# Patient Record
Sex: MALE | Employment: FULL TIME | URBAN - METROPOLITAN AREA
[De-identification: names, ages, dates, MRNs, and addresses within clinical notes are randomized per-mention and may not be internally consistent; named-entity substitution may affect disease eponyms.]

---

## 2017-07-19 ENCOUNTER — HOSPITAL ENCOUNTER (OUTPATIENT)
Dept: NON INVASIVE DIAGNOSTICS | Age: 62
Discharge: HOME | End: 2017-07-19
Attending: FAMILY MEDICINE
Payer: COMMERCIAL

## 2017-07-19 ENCOUNTER — HOSPITAL ENCOUNTER (OUTPATIENT)
Dept: ULTRASOUND IMAGING | Age: 62
Discharge: HOME | End: 2017-07-19
Attending: FAMILY MEDICINE
Payer: COMMERCIAL

## 2017-07-19 DIAGNOSIS — R94.31 ABNORMAL EKG: ICD-10-CM

## 2017-07-19 DIAGNOSIS — R74.02 NONSPECIFIC ELEVATION OF LEVELS OF TRANSAMINASE OR LACTIC ACID DEHYDROGENASE (LDH): ICD-10-CM

## 2017-07-19 DIAGNOSIS — R00.1 BRADYCARDIA: ICD-10-CM

## 2017-07-19 DIAGNOSIS — R74.01 NONSPECIFIC ELEVATION OF LEVELS OF TRANSAMINASE OR LACTIC ACID DEHYDROGENASE (LDH): ICD-10-CM

## 2017-07-19 DIAGNOSIS — R94.5 NONSPECIFIC ABNORMAL RESULTS OF LIVER FUNCTION STUDY: ICD-10-CM

## 2017-07-19 LAB
ATTENDING PHYSICIAN, CST07: NORMAL
DIAGNOSIS, 93000: NORMAL
DUKE TM SCORE RESULT, CST14: NORMAL
DUKE TREADMILL SCORE, CST13: NORMAL
ECG INTERP BEFORE EX, CST11: NORMAL
ECG INTERP DURING EX, CST12: NORMAL
FUNCTIONAL CAPACITY, CST17: NORMAL
KNOWN CARDIAC CONDITION, CST08: NORMAL
MAX. DIASTOLIC BP, CST04: 60 MMHG
MAX. HEART RATE, CST05: 166 BPM
MAX. SYSTOLIC BP, CST03: 146 MMHG
OVERALL BP RESPONSE TO EXERCISE, CST16: NORMAL
OVERALL HR RESPONSE TO EXERCISE, CST15: NORMAL
PEAK EX METS, CST10: 17.2 METS
PROTOCOL NAME, CST01: NORMAL
TEST INDICATION, CST09: NORMAL

## 2017-07-19 PROCEDURE — 93351 STRESS TTE COMPLETE: CPT

## 2017-07-19 PROCEDURE — 76705 ECHO EXAM OF ABDOMEN: CPT

## 2017-07-19 PROCEDURE — 93306 TTE W/DOPPLER COMPLETE: CPT

## 2017-07-28 ENCOUNTER — HOSPITAL ENCOUNTER (OUTPATIENT)
Dept: PHYSICAL THERAPY | Age: 62
Discharge: HOME | End: 2017-07-28
Payer: COMMERCIAL

## 2017-07-28 PROCEDURE — 97535 SELF CARE MNGMENT TRAINING: CPT

## 2017-07-28 PROCEDURE — 97161 PT EVAL LOW COMPLEX 20 MIN: CPT

## 2017-07-28 PROCEDURE — 97110 THERAPEUTIC EXERCISES: CPT

## 2017-07-28 NOTE — PROGRESS NOTES
PT DAILY TREATMENT NOTE/SHOULDER EVAL 3-16    Patient Name: Yisel Matute  Date:2017  : 1955  [x]  Patient  Verified  Payor: Rajiv Jean / Plan: [address] PPO / Product Type: PPO /    In time:4:35pm  Out time:5:15pm  Total Treatment Time (min): 40  Total Timed Codes (min): 24  1:1 Treatment Time ( only): NA   Visit #: 1 of 4-8    Treatment Area: Pain in left shoulder [M25.512]    SUBJECTIVE  Pain Level (0-10 scale): 2-3  []constant []intermittent []improving []worsening []no change since onset    Any medication changes, allergies to medications, adverse drug reactions, diagnosis change, or new procedure performed?: [x] No    [] Yes (see summary sheet for update)  Subjective functional status/changes:     Pt reports acute onset of L ant shoulder pain while chipping during golf 6 weeks ago. He reports pain has diminished somewhat, but is persisting, worst in the morning particularly if he sleeps on the L side. PLOF: golfing and all activity void of pain  Limitations to PLOF: limited ease of reaching backwards and lifting objects, including doffing pants, d/t L ant shoulder pain, discomfort with golfing  Mechanism of Injury: chipping when golfing 6 weeks ago  Current symptoms/Complaints: L ant shoulder ache worse with reaching back, lifting including doffing pants, and increased after L sidelying overnight  Previous Treatment/Compliance: referral through PCP, other than that none per pt report, x-ray unremarkable for acute findings per pt report  PMHx/Surgical Hx: R adhesive capsulitis PMHX  Work Hx: see intake  Living Situation:   Pt Goals: Return to golf.  See intake  Barriers: []pain []financial []time []transportation []other  Motivation: appears well motivated  Substance use: []Alcohol []Tobacco []other:   FABQ Score: []low []elevate  Cognition: A & O x 4    Other:    OBJECTIVE/EXAMINATION    16 min [x]Eval                  []Re-Eval           14 min Therapeutic Exercise:  [] See flow sheet : HEP creation and pt instruction per handout   Rationale: increase strength to improve the patients ability to improve ease of daily activity and return to recreational sport. Self Care: 10 minutes of pt education regarding relevant anatomy, diagnosis, prognosis, plan of care, activity modification including brief cessation of golfing, use of modalities to facilitate pt compliance, improved outcomes, and pt self management. With   [] TE   [] TA   [] neuro   [] other: Patient Education: [x] Review HEP    [] Progressed/Changed HEP based on:   [] positioning   [] body mechanics   [] transfers   [] heat/ice application    [] other:      Other Objective/Functional Measures:    Physical Therapy Evaluation - Shoulder    Posture: [] Poor    [x] Fair    [] Good    Describe:    ROM:  [] Unable to assess at this time                                           AROM                                                              PROM   Left Right  Left Right   Flexion WNL WNL Flexion 160 pain limited @ end range only 173   Extension WNL but with pain throughout range WNL Extension Ant shoulder pain towards end range WNL   Scaption/ABD WNL discomfort WNL Scaptin/ABD     ER @ 0 Degrees   ER @ 0 Degrees     ER @ 90 Degrees   ER @ 90 Degrees 92 91   IR @ 90 Degrees   IR @ 90 Degrees 68 62     End Feel / Painful Arc:    Strength:   [] Unable to assess at this time                                                                            L (1-5) R (1-5) Pain   Flexors 4 4+ [x] Yes   [] No   Abductors 4+ 5 [x] Yes   [] No   External Rotators 4- 4+ [x] Yes   [] No   Internal Rotators 5 5 [] Yes   [x] No   Supraspinatus   [] Yes   [] No   Serratus Anterior   [] Yes   [] No   Lower Trapezius   [] Yes   [] No   Elbow Flexion 4+ 5 [x] Yes minimal   [] No   Elbow Extension 5 5 [] Yes   [x] No       Scapulohumoral Control / Rhythm:  Able to eccentrically lower with good control?  Left: [x] Yes   [] No     Right: [x] Yes   [] No    Accessory Motions:    Palpation  [] Min  [x] Mod  [] Severe    Location: L bicipital sulcus (concordant sign)  [] Min  [] Mod  [] Severe    Location:  [] Min  [] Mod  [] Severe    Location:    Optional Tests:    Sensation Left Right Reflexes Left Right   Biceps (C5)   Biceps (C5)     Ramsey Radial(C6-7)   Brachioradialis (C6)     Ramsey Ulnar(C8-T1)   Triceps (C7)       Adson's Test  [] Pos   [] Neg Yergason's Test [x] Pos   [] Neg  Emilee's Test  [] Pos   [] Neg Froilan's Sign [] Pos   [] Neg  Neer's Test  [x] Pos   [] Neg Clunk Test  [] Pos   [x] Neg  Hawkin's Test  [x] Pos   [] Neg AC Joint  [] Pos   [x] Neg  Speed's Test  [x] Pos   [] Neg SC Joint  [] Pos   [x] Neg  Empty Can  [] Pos   [x] Neg Pectoral Tightness [] Pos   [] Neg  Anterior Apprehension [] Pos   [x] Neg   Posterior Apprehension [] Pos   [x] Neg  (-) Hartwick's test    Other Tests / Comments:   FIR: R T10, L T12 with pain  MARIELENA: T2 bilat     Pain Level (0-10 scale) post treatment: 2-3    ASSESSMENT/Changes in Function: Per POC. Patient will continue to benefit from skilled PT services to modify and progress therapeutic interventions, address functional mobility deficits, address ROM deficits, address strength deficits, analyze and cue movement patterns and instruct in home and community integration to attain remaining goals. [x]  See Plan of Care  []  See progress note/recertification  []  See Discharge Summary         Progress towards goals / Updated goals:  Per POC. PLAN  [x]  Upgrade activities as tolerated     [x]  Continue plan of care  []  Update interventions per flow sheet       []  Discharge due to:_  [x]  Other:_Start with isometrics and ROM. Once pain diminishes, progress gradually into isotonic strengthening void of pain provocation and then return to golf activity. If plateauing, advise referral to ortho/imaging to assess bicipital involvement.       Josefina Vidales, PT, DPT, ATC, [facility] 7/28/2017  4:30 PM

## 2017-07-28 NOTE — PROGRESS NOTES
[facility]  [address]  [place], [address]  [phone] fax    Plan of Care/ Statement of Necessity for Physical Therapy Services    Patient name: Susan Alvarez Start of Care: 2017   Referral source: Pao Bello MD : 1955    Medical Diagnosis: Pain in left shoulder [M25.512]   Onset Date:6 weeks ago    Treatment Diagnosis: L biceps tendinopathy   Prior Hospitalization: see medical history Provider#: [id number]   Medications: Verified on Patient summary List    Comorbidities: R adhesive capsulitis PMHX, no other remarkable per pt report   Prior Level of Function: golfing and all activity void of pain     The Plan of Care and following information is based on the information from the initial evaluation. Assessment/ key information: Pt is a 63year old male presenting with acute onset of L shoulder pain when chipping playing golf 6 weeks ago with reports of continued pain and difficulty with shoulder extension, lifting, and golfing. Signs and symptoms possibly indicative of L long head bicipital pathology vs RTC involvement as evidenced by (+) Speed's, (+) Hawkin's dominguez, (+) Neer's, (+) Yergason's, pain limited L shoulder extension and FIR as well as end range flexion, minimal L bicep's flexion and shoulder elevation and ER weakness, and TTP L bicipital sulcus. (-) visible bicep's deformity or palpable defect. Pt will benefit from skilled PT to address his impairments and improve his level of function, but will refer to ortho for further assessment if plateau occurs. D/t significant L long head biceps TTP and possible inflammatory component, pt may benefit from the use of Iontophoresis with Dexamethasone. Please sign if you agree to utilize in plan of care.     Evaluation Complexity History LOW Complexity : Zero comorbidities / personal factors that will impact the outcome / POC; Examination MEDIUM Complexity : 3 Standardized tests and measures addressing body structure, function, activity limitation and / or participation in recreation  ;Presentation LOW Complexity : Stable, uncomplicated  ;Clinical Decision Making MEDIUM Complexity : FOTO score of 26-74  Overall Complexity Rating: LOW   Problem List: pain affecting function, decrease ROM, decrease strength, decrease ADL/ functional abilitiies, decrease activity tolerance and decrease flexibility/ joint mobility   Treatment Plan may include any combination of the following: Therapeutic exercise, Therapeutic activities, Neuromuscular re-education, Physical agent/modality, Manual therapy, Patient education and Self Care training Iontophoresis with Dexamethasone  Patient / Family readiness to learn indicated by: asking questions, trying to perform skills and interest  Persons(s) to be included in education: patient (P)  Barriers to Learning/Limitations: None  Patient Goal (s): Return to golf.   Patient Self Reported Health Status: good  Rehabilitation Potential: good    Short Term Goals: To be accomplished in 2 weeks:   1. Patient will report performance of HEP at least 2 times per day to facilitate improved outcomes and improved self management. 2. Pt will demonstrate full L shoulder PROM flexion void of pain provocation to improve ease of daily activity. 3. Pt will demonstrate L FIR to T10 void of pain provocation to improve ease of dressing. Long Term Goals: To be accomplished in 4 weeks:   1. Patient will report FOTO score of 70 or better to indicate significant improvement in functional status. 2. Pt will demonstrate 5/5 L bicep's flexion void of pain to improve lifting tasks. 3. Pt will demonstrate 4+/5 L shoulder ER MMT to improve shoulder stability during daily tasks. 4. Pt will demonstrate ability to perform full golf swing x 5 void of pain provocation to facilitate return to golf.   Frequency / Duration: Patient to be seen 2 times per week for 4 weeks. Patient/ Caregiver education and instruction: Diagnosis, prognosis, self care, activity modification and exercises   [x]  Plan of care has been reviewed with PTA    Chandra Ram, PT, DPT, ATC, CSCS 7/28/2017 7:46 PM    ________________________________________________________________________    I certify that the above Therapy Services are being furnished while the patient is under my care. I agree with the treatment plan and certify that this therapy is necessary.     [de-identified] Signature:____________________  Date:____________Time: _________    Please sign and return to [facility] [address] [place] [address] [place] [address], [facility] [address]  [phone] fax

## 2017-08-03 ENCOUNTER — HOSPITAL ENCOUNTER (OUTPATIENT)
Dept: PHYSICAL THERAPY | Age: 62
Discharge: HOME | End: 2017-08-03
Payer: COMMERCIAL

## 2017-08-03 PROCEDURE — 97110 THERAPEUTIC EXERCISES: CPT

## 2017-08-03 NOTE — PROGRESS NOTES
PT DAILY TREATMENT NOTE     Patient Name: Levorn Severe  Date:8/3/2017  : 1955  [x]  Patient  Verified  Payor: Sara Kohler / [facility] / Product Type: PPO /    In time:5:30pm  Out time:6:05pm  Total Treatment Time (min): 35  Visit #: 2 of 8    Treatment Area: Pain in left shoulder [M25.512]    SUBJECTIVE  Pain Level (0-10 scale): 2  Any medication changes, allergies to medications, adverse drug reactions, diagnosis change, or new procedure performed?: [x] No    [] Yes (see summary sheet for update)  Subjective functional status/changes:   [] No changes reported  Pt reports increased pain last Saturday, but reports pain has gradually diminished since. He reports consistent HEP performance and has been holding off from golf. Reports continued discomfort with donning pants and reaching behind. OBJECTIVE  35 min Therapeutic Exercise:  [x] See flow sheet :   Rationale: increase ROM and increase strength to improve the patients ability to improve ease of ADLs. With   [] TE   [] TA   [] neuro   [] other: Patient Education: [x] Review HEP    [] Progressed/Changed HEP based on:   [] positioning   [] body mechanics   [] transfers   [] heat/ice application    [] other:      Other Objective/Functional Measures:      Pain Level (0-10 scale) post treatment: 2    ASSESSMENT/Changes in Function: Pt reports minimal discomfort during interventions. Will progress as tolerated into AROM and light strengthening and modify per pt limitations NV. Patient will continue to benefit from skilled PT services to modify and progress therapeutic interventions, address functional mobility deficits, address ROM deficits, address strength deficits, analyze and address soft tissue restrictions, analyze and cue movement patterns and instruct in home and community integration to attain remaining goals.      []  See Plan of Care  []  See progress note/recertification  []  See Discharge Summary         Progress towards goals / Updated goals:  Short Term Goals: To be accomplished in 2 weeks:                         1. Patient will report performance of HEP at least 2 times per day to facilitate improved outcomes and improved self management. 2. Pt will demonstrate full L shoulder PROM flexion void of pain provocation to improve ease of daily activity. 3. Pt will demonstrate L FIR to T10 void of pain provocation to improve ease of dressing. Long Term Goals: To be accomplished in 4 weeks:                         1. Patient will report FOTO score of 70 or better to indicate significant improvement in functional status. 2. Pt will demonstrate 5/5 L bicep's flexion void of pain to improve lifting tasks. 3. Pt will demonstrate 4+/5 L shoulder ER MMT to improve shoulder stability during daily tasks. 4. Pt will demonstrate ability to perform full golf swing x 5 void of pain provocation to facilitate return to golf. Frequency / Duration: Patient to be seen 2 times per week for 4 weeks.     PLAN  [x]  Upgrade activities as tolerated     [x]  Continue plan of care  []  Update interventions per flow sheet       []  Discharge due to:_  []  Other:_      Neto Jenkins, PT, DPT, ATC ,CSCS 8/3/2017  7:02 PM    Future Appointments  Date Time Provider Lexy Moore   8/8/2017 5:30 PM  [address] [facility]   8/10/2017 5:30 [facility]   8/21/2017 5:30 PM [address] [facility]   8/24/2017 5:30 PM [address] [facility]   8/29/2017 5:30 PM [id number] [facility]

## 2017-08-04 ENCOUNTER — APPOINTMENT (OUTPATIENT)
Dept: PHYSICAL THERAPY | Age: 62
End: 2017-08-04
Payer: COMMERCIAL

## 2017-08-08 ENCOUNTER — HOSPITAL ENCOUNTER (OUTPATIENT)
Dept: PHYSICAL THERAPY | Age: 62
Discharge: HOME | End: 2017-08-08
Payer: COMMERCIAL

## 2017-08-08 PROCEDURE — 97110 THERAPEUTIC EXERCISES: CPT

## 2017-08-08 PROCEDURE — 97112 NEUROMUSCULAR REEDUCATION: CPT

## 2017-08-08 NOTE — PROGRESS NOTES
PT DAILY TREATMENT NOTE     Patient Name: Tari Herrera  Date:2017  : 1955  [x]  Patient  Verified  Payor: Peter Both / Plan: [facility] [employer] PPO / Product Type: PPO /    In time:5:30pm  Out time:6:15pm  Total Treatment Time (min): 45 min  Visit #: 3 of 8    Treatment Area: Pain in left shoulder [M25.512]    SUBJECTIVE  Pain Level (0-10 scale): 0  Any medication changes, allergies to medications, adverse drug reactions, diagnosis change, or new procedure performed?: [x] No    [] Yes (see summary sheet for update)  Subjective functional status/changes:   [] No changes reported  \"It's improving. It doesn't really hurt when I'm not moving it but it still catches sometimes when I'm doing certain things. \"    OBJECTIVE  35 min Therapeutic Exercise:  [x] See flow sheet :   Rationale: increase ROM and increase strength to improve the patients ability to improve ease of [place] reach. 10 min Neuromuscular Re-education:  [x]  See flow sheet :   Rationale: increase ROM, increase strength and improve coordination  to improve the patients ability to improve ease of ADLs and [place] reach. With   [] TE   [] TA   [] neuro   [] other: Patient Education: [x] Review HEP    [] Progressed/Changed HEP based on:   [] positioning   [] body mechanics   [] transfers   [] heat/ice application    [] other:      Other Objective/Functional Measures: discomfort with end range active ER, particularly in 90/90     Pain Level (0-10 scale) post treatment: 0    ASSESSMENT/Changes in Function: Pt reports continued decline in pain. Tolerated increased strengthening interventions, but with discomfort in ER positions, particularly when elevated. Will continue to progress as tolerated and update HEP NV if progressing well.     Patient will continue to benefit from skilled PT services to modify and progress therapeutic interventions, address functional mobility deficits, address ROM deficits, address strength deficits, analyze and cue movement patterns and instruct in home and community integration to attain remaining goals. []  See Plan of Care  []  See progress note/recertification  []  See Discharge Summary         Progress towards goals / Updated goals:  Short Term Goals: To be accomplished in 2 weeks:                         0. Patient will report performance of HEP at least 2 times per day to facilitate improved outcomes and improved self management.                         2. Pt will demonstrate full L shoulder PROM flexion void of pain provocation to improve ease of daily activity.                        0. Pt will demonstrate L FIR to T10 void of pain provocation to improve ease of dressing. Long Term Goals: To be accomplished in 4 weeks:                         1. Patient will report FOTO score of 70 or better to indicate significant improvement in functional status.                        0. Pt will demonstrate 5/5 L bicep's flexion void of pain to improve lifting tasks.                         3. Pt will demonstrate 4+/5 L shoulder ER MMT to improve shoulder stability during daily tasks.                         4. Pt will demonstrate ability to perform full golf swing x 5 void of pain provocation to facilitate return to golf.     PLAN  [x]  Upgrade activities as tolerated     [x]  Continue plan of care  []  Update interventions per flow sheet       []  Discharge due to:_  []  Other:_      Martha Denton, PT, DPT, ATC, CSCS 8/8/2017  5:34 PM    Future Appointments  Date Time Provider Lexy Moore   8/10/2017 5:30 PM Yossi Cortez [address] [facility]   8/21/2017 5:30 PM [facility]   8/24/2017 5:30 PM [address] [facility]   8/29/2017 5:30 PM [id number] [facility]

## 2017-08-10 ENCOUNTER — HOSPITAL ENCOUNTER (OUTPATIENT)
Dept: PHYSICAL THERAPY | Age: 62
Discharge: HOME | End: 2017-08-10
Payer: COMMERCIAL

## 2017-08-10 PROCEDURE — 97033 APP MDLTY 1+IONTPHRSIS EA 15: CPT

## 2017-08-10 PROCEDURE — 97112 NEUROMUSCULAR REEDUCATION: CPT

## 2017-08-10 PROCEDURE — 97110 THERAPEUTIC EXERCISES: CPT

## 2017-08-10 NOTE — PROGRESS NOTES
PT DAILY TREATMENT NOTE 3-16    Patient Name: Bessy Sepulveda  Date:8/10/2017  : 1955  [x]  Patient  Verified  Payor: Clara Roach / [facility] [facility] / Product Type: PPO /    In time:5:30  Out time:6:08  Total Treatment Time (min): 38  Visit #: 4 of 8    Treatment Area: Pain in left shoulder [M25.512]    SUBJECTIVE  Pain Level (0-10 scale): 0  Any medication changes, allergies to medications, adverse drug reactions, diagnosis change, or new procedure performed?: [x] No    [] Yes (see summary sheet for update)  Subjective functional status/changes:   [] No changes reported  \"The pain is only sometimes, like when I put my belt on. Tricep was a little sore after last time. \" Patient reports that he will be out of town for one and a half weeks.     OBJECTIVE  Modality rationale: decrease inflammation and decrease pain to improve the patients ability to ease ADL tolerance and for return to golf    Min Type Additional Details    [] Estim:  []Unatt       []IFC  []Premod                        []Other:  []w/ice   []w/heat  Position:  Location:    [] Estim: []Att    []TENS instruct  []NMES                    []Other:  []w/US   []w/ice   []w/heat  Position:  Location:    []  Traction: [] Cervical       []Lumbar                       [] Prone          []Supine                       []Intermittent   []Continuous Lbs:  [] before manual  [] after manual    []  Ultrasound: []Continuous   [] Pulsed                           []1MHz   []3MHz Location:  W/cm2:   8 (patient education and set up) [x]  Iontophoresis with dexamethasone         Location: long head of biceps [x] Take home patch   [] In clinic    []  Ice     []  heat  []  Ice massage  []  Laser   []  Anodyne Position:  Location:    []  Laser with stim  []  Other: Position:  Location:    []  Vasopneumatic Device Pressure:       [] lo [] med [] hi   Temperature: [] lo [] med [] hi   [x] Skin assessment post-treatment:  [x]intact []redness- no adverse reaction    []redness  adverse reaction:     20 min Therapeutic Exercise:  [x] See flow sheet :   Rationale: increase ROM, increase strength and improve coordination to improve the patients ability to improve ease of [place] reaching    10 min Neuromuscular Re-education:  [x]  See flow sheet : Lars functional ER reps, swiss ball letter reps   Rationale: increase ROM, increase strength, improve coordination and increase proprioception  to improve the patients ability to maintain upright posture and improve scapulohumeral rhythm            With   [x] TE   [] TA   [] neuro   [] other: Patient Education: [x] Review HEP    [] Progressed/Changed HEP based on:   [] positioning   [] body mechanics   [] transfers   [] heat/ice application    [] other:      Other Objective/Functional Measures:   Patient provided with updated HEP and green theraband for home use  Trial of take home ionto patch  Tactile cues to decrease UT hike with swiss ball letter reps    Pain Level (0-10 scale) post treatment: 0    ASSESSMENT/Changes in Function: Patient continues with good tolerance to progressed strengthening interventions with no reported discomfort in ER positioning today. Trial of ionto take home patch, patient provided with ionto patient education sheet and instructions. Provided patient with updated HEP and green theraband for continued strengthening outside of therapy as patient will be out of town for a week and half. Will continue to progress interventions per patient tolerance. Patient will continue to benefit from skilled PT services to modify and progress therapeutic interventions, address functional mobility deficits, address ROM deficits, address strength deficits, analyze and address soft tissue restrictions, analyze and cue movement patterns, analyze and modify body mechanics/ergonomics and assess and modify postural abnormalities to attain remaining goals.      []  See Plan of Care  []  See progress note/recertification  []  See Discharge Summary         Progress towards goals / Updated goals:  Short Term Goals: To be accomplished in 2 weeks:                         3. Patient will report performance of HEP at least 2 times per day to facilitate improved outcomes and improved self management.                         2. Pt will demonstrate full L shoulder PROM flexion void of pain provocation to improve ease of daily activity.                        8. Pt will demonstrate L FIR to T10 void of pain provocation to improve ease of dressing. Long Term Goals: To be accomplished in 4 weeks:                         1. Patient will report FOTO score of 70 or better to indicate significant improvement in functional status.                        1. Pt will demonstrate 5/5 L bicep's flexion void of pain to improve lifting tasks.                         3. Pt will demonstrate 4+/5 L shoulder ER MMT to improve shoulder stability during daily tasks.                         4. Pt will demonstrate ability to perform full golf swing x 5 void of pain provocation to facilitate return to golf.     PLAN  []  Upgrade activities as tolerated     [x]  Continue plan of care  []  Update interventions per flow sheet       []  Discharge due to:_  []  Other:_      Radha Hendrickson 8/10/2017  5:23 PM    Future Appointments  Date Time Provider Lexy Moore   8/10/2017 5:30 PM Yossi Cortze [place] [address] [facility]   8/21/2017 5:30 PM [address] [place] [address]   8/24/2017 5:30 PM 92 [facility]   8/29/2017 5:30 PM [id number] [facility]

## 2017-08-21 ENCOUNTER — HOSPITAL ENCOUNTER (OUTPATIENT)
Dept: PHYSICAL THERAPY | Age: 62
Discharge: HOME | End: 2017-08-21
Payer: COMMERCIAL

## 2017-08-21 PROCEDURE — 97112 NEUROMUSCULAR REEDUCATION: CPT

## 2017-08-21 PROCEDURE — 97530 THERAPEUTIC ACTIVITIES: CPT

## 2017-08-21 PROCEDURE — 97110 THERAPEUTIC EXERCISES: CPT

## 2017-08-21 NOTE — PROGRESS NOTES
PT DAILY TREATMENT NOTE     Patient Name: Lesli Herzog  Date:2017  : 1955  [x]  Patient  Verified  Payor: Lam Sheets / Plan: [facility] [employer] PPO / Product Type: PPO /    In time:5:30pm  Out time:6:01pm  Total Treatment Time (min): 31  Visit #: 5 of 8    Treatment Area: Pain in left shoulder [M25.512]    SUBJECTIVE  Pain Level (0-10 scale): 0  Any medication changes, allergies to medications, adverse drug reactions, diagnosis change, or new procedure performed?: [x] No    [] Yes (see summary sheet for update)  Subjective functional status/changes:   [] No changes reported  Pt reports his shoulder has been doing better, but reports some discomfort when reaching up across his body and pressing his garage door button. OBJECTIVE  16 min Therapeutic Exercise:  [x] See flow sheet :   Rationale: increase ROM, increase strength and improve coordination to improve the patients ability to improve ease of ADLs. 8 min Neuromuscular Re-education:  [x]  See flow sheet :   Rationale: increase strength, improve coordination and increase proprioception  to improve the patients ability to improve shoulder stability. 8 min Therapeutic Activity:  []  See flow sheet :   Rationale: improve coordination  to improve the patients ability to return to golf activity. With   [] TE   [] TA   [] neuro   [] other: Patient Education: [x] Review HEP    [] Progressed/Changed HEP based on:   [] positioning   [] body mechanics   [] transfers   [] heat/ice application    [] other:      Other Objective/Functional Measures:    Minimal L ant shoulder discomfort with end range prone Ts, otherwise WNL during interventions     Progressed into full repeated golf swings, including divots void of pain provocation.     Pain Level (0-10 scale) post treatment: 0    ASSESSMENT/Changes in Function: Pt is progressing well demonstrating ability to perform golfing swing, closed chain and open chain strengthening void of pain. He does report ant shoulder discomfort with end range HABD, but otherwise asymptomatic. Continue progressing as tolerated. Advised pt to begin progressing back into return to golf activity as tolerated. Patient will continue to benefit from skilled PT services to modify and progress therapeutic interventions, address strength deficits, analyze and cue movement patterns and instruct in home and community integration to attain remaining goals. []  See Plan of Care  []  See progress note/recertification  []  See Discharge Summary         Progress towards goals / Updated goals:  Short Term Goals: To be accomplished in 2 weeks:                         7. Patient will report performance of HEP at least 2 times per day to facilitate improved outcomes and improved self management.                         2. Pt will demonstrate full L shoulder PROM flexion void of pain provocation to improve ease of daily activity. Grossly near met 8/21/2017                         3. Pt will demonstrate L FIR to T10 void of pain provocation to improve ease of dressing. Long Term Goals: To be accomplished in 4 weeks:                         1. Patient will report FOTO score of 70 or better to indicate significant improvement in functional status.                        0. Pt will demonstrate 5/5 L bicep's flexion void of pain to improve lifting tasks.                         3. Pt will demonstrate 4+/5 L shoulder ER MMT to improve shoulder stability during daily tasks.                         4. Pt will demonstrate ability to perform full golf swing x 5 void of pain provocation to facilitate return to golf.  Met 8/21/2017    PLAN  [x]  Upgrade activities as tolerated     [x]  Continue plan of care  []  Update interventions per flow sheet       []  Discharge due to:_  []  Other:_      Khushi Rawls, PT, DPT, ATC, CSCS 8/21/2017  5:54 PM    Future Appointments  Date Time Provider Lexy Moore 8/24/2017 5:30 PM [address] [facility]   8/29/2017 5:30 PM Sameer Elias

## 2017-08-24 ENCOUNTER — HOSPITAL ENCOUNTER (OUTPATIENT)
Dept: PHYSICAL THERAPY | Age: 62
Discharge: HOME | End: 2017-08-24
Payer: COMMERCIAL

## 2017-08-24 PROCEDURE — 97110 THERAPEUTIC EXERCISES: CPT

## 2017-08-24 PROCEDURE — 97112 NEUROMUSCULAR REEDUCATION: CPT

## 2017-08-24 NOTE — PROGRESS NOTES
PT DAILY TREATMENT NOTE     Patient Name: Wendy Nicholas  Date:2017  : 1955  [x]  Patient  Verified  Payor: Franchesca Duffy / Plan: [facility] [employer] PPO / Product Type: PPO /    In time: 5:30pm  Out time:6:08pm  Total Treatment Time (min): 38  Visit #: 6 of 8    Treatment Area: Pain in left shoulder [M25.512]    SUBJECTIVE  Pain Level (0-10 scale): 0  Any medication changes, allergies to medications, adverse drug reactions, diagnosis change, or new procedure performed?: [x] No    [] Yes (see summary sheet for update)  Subjective functional status/changes:   [] No changes reported  Pt reports he played 9 holes of golf without limitations since last visit. Reports some continued discomfort when pressing his garage door button, but discomfort has lobo receding over the last week. OBJECTIVE  30 min Therapeutic Exercise:  [x] See flow sheet :   Rationale: increase ROM, increase strength and improve coordination to improve the patients ability to improve ease of ADLs. 8 min Neuromuscular Re-education:  [x]  See flow sheet :   Rationale: increase strength, improve coordination and increase proprioception  to improve the patients ability to improve shoulder stability          With   [] TE   [] TA   [] neuro   [] other: Patient Education: [x] Review HEP    [] Progressed/Changed HEP based on:   [] positioning   [] body mechanics   [] transfers   [] heat/ice application    [x] other: issued [employer] program for HEP 2x/week 2 sets 10-20 reps each exercise     Other Objective/Functional Measures:    5/5 L bicep flex and shoulder ER, mild distal L biceps discomfort reported    FIR T7 void of pain     Pain Level (0-10 scale) post treatment: 0    ASSESSMENT/Changes in Function: Pt has progressed well and demonstrate improved shoulder mobility, WNL shoulder and elbow strength, and returned to golf void of pain.  He does report minimal L distal biceps discomfort with maximal resisted biceps flexion. He has been instructed in updated HEP to continue self management and will be DC from skilled PT at this time. []  See Plan of Care  []  See progress note/recertification  [x]  See Discharge Summary         Progress towards goals / Updated goals:  Short Term Goals: To be accomplished in 2 weeks:                         4. Patient will report performance of HEP at least 2 times per day to facilitate improved outcomes and improved self management.                         2. Pt will demonstrate full L shoulder PROM flexion void of pain provocation to improve ease of daily activity. Grossly near met 8/21/2017                         3. Pt will demonstrate L FIR to T10 void of pain provocation to improve ease of dressing. L T7 8/24/2017  Long Term Goals: To be accomplished in 4 weeks:                         1. Patient will report FOTO score of 70 or better to indicate significant improvement in functional status.                        5. Pt will demonstrate 5/5 L bicep's flexion void of pain to improve lifting tasks. Near met 5/5 with L distal bicep's minimal discomfort reported 8/24/2017                         3. Pt will demonstrate 4+/5 L shoulder ER MMT to improve shoulder stability during daily tasks. 5/5 bilat 8/24/2017                         4. Pt will demonstrate ability to perform full golf swing x 5 void of pain provocation to facilitate return to golf. Met 8/21/2017    PLAN  []  Upgrade activities as tolerated     []  Continue plan of care  []  Update interventions per flow sheet    [x]  Discharge due to:_DC goals met or near met. Progressed to updated HEP and returning to golf as tolerated.   []  Other:_      Sudeep Early, PT, DPT, ATC, CSCS 8/24/2017  5:44 PM    Future Appointments  Date Time Provider Lexy Moore   8/29/2017 5:30 PM [id number] [facility]

## 2017-08-24 NOTE — PROGRESS NOTES
[facility] [address] [address] [address]  [place], [address]  [phone] fax    Physical Therapy Discharge Summary  Patient name: Florecita Nicole Start of Care: 2017   Referral source: Maame Holt MD : 1955   Medical/Treatment Diagnosis: Pain in left shoulder [M25.512] Onset Date:6 weeks prior to start of care. Prior Hospitalization: see medical history Provider#: [id number]   Medications: Verified on Patient Summary List     Comorbidities: R adhesive capsulitis PMHX, no other remarkable per pt report   Prior Level of Function: golfing and all activity void of pain  Visits from Start of Care: 6    Missed Visits: 0  Reporting Period : 2017 to 2017    Summary of Care:  Short Term Goals: To be accomplished in 2 weeks:                         1. Patient will report performance of HEP at least 2 times per day to facilitate improved outcomes and improved self management.                         2. Pt will demonstrate full L shoulder PROM flexion void of pain provocation to improve ease of daily activity. Grossly near met 2017                         3. Pt will demonstrate L FIR to T10 void of pain provocation to improve ease of dressing. L T7 2017  Long Term Goals: To be accomplished in 4 weeks:                         1. Patient will report FOTO score of 70 or better to indicate significant improvement in functional status.                        5. Pt will demonstrate 5/5 L bicep's flexion void of pain to improve lifting tasks. Near met 5/5 with L distal bicep's minimal discomfort reported 2017                         3. Pt will demonstrate 4+/5 L shoulder ER MMT to improve shoulder stability during daily tasks. 5/5 bilat 2017                         4. Pt will demonstrate ability to perform full golf swing x 5 void of pain provocation to facilitate return to golf.  Met 2017      ASSESSMENT/RECOMMENDATIONS: Pt has progressed well and demonstrate improved shoulder mobility, WNL shoulder and elbow strength, and returned to golf void of pain. He does report minimal L distal biceps discomfort with maximal resisted biceps flexion. He has been instructed in updated HEP to continue self management and will be DC from skilled PT at this time.      [x]Discontinue therapy: [x]Patient has reached or is progressing toward set goals      []Patient is non-compliant or has abdicated      []Due to lack of appreciable progress towards set goals    Karly Richardson, PT, DPT, ATC, CSCS 8/24/2017 7:24 PM

## 2017-08-29 ENCOUNTER — APPOINTMENT (OUTPATIENT)
Dept: PHYSICAL THERAPY | Age: 62
End: 2017-08-29
Payer: COMMERCIAL